# Patient Record
Sex: FEMALE | Race: WHITE | NOT HISPANIC OR LATINO | Employment: OTHER | URBAN - METROPOLITAN AREA
[De-identification: names, ages, dates, MRNs, and addresses within clinical notes are randomized per-mention and may not be internally consistent; named-entity substitution may affect disease eponyms.]

---

## 2022-01-13 ENCOUNTER — MEDICAL CORRESPONDENCE (OUTPATIENT)
Dept: HEALTH INFORMATION MANAGEMENT | Facility: CLINIC | Age: 82
End: 2022-01-13
Payer: COMMERCIAL

## 2022-03-12 DIAGNOSIS — Z11.59 ENCOUNTER FOR SCREENING FOR OTHER VIRAL DISEASES: Primary | ICD-10-CM

## 2022-04-20 RX ORDER — HYDROCHLOROTHIAZIDE 12.5 MG/1
12.5 CAPSULE ORAL DAILY
COMMUNITY
Start: 2021-04-01

## 2022-04-20 RX ORDER — DORZOLAMIDE HCL 20 MG/ML
1 SOLUTION/ DROPS OPHTHALMIC 2 TIMES DAILY
COMMUNITY

## 2022-04-20 RX ORDER — AMLODIPINE BESYLATE 2.5 MG/1
7.5 TABLET ORAL DAILY
COMMUNITY
Start: 2021-04-01

## 2022-04-20 RX ORDER — MULTIVITAMIN
1 TABLET ORAL DAILY
COMMUNITY

## 2022-04-20 RX ORDER — TIOTROPIUM BROMIDE INHALATION SPRAY 3.12 UG/1
2 SPRAY, METERED RESPIRATORY (INHALATION) DAILY
COMMUNITY
Start: 2021-05-27

## 2022-04-25 ENCOUNTER — HOSPITAL ENCOUNTER (INPATIENT)
Facility: CLINIC | Age: 82
LOS: 2 days | Discharge: HOME | DRG: 177 | End: 2022-04-27
Attending: INTERNAL MEDICINE | Admitting: INTERNAL MEDICINE
Payer: COMMERCIAL

## 2022-04-25 ENCOUNTER — APPOINTMENT (OUTPATIENT)
Dept: GENERAL RADIOLOGY | Facility: CLINIC | Age: 82
DRG: 177 | End: 2022-04-25
Attending: ANESTHESIOLOGY
Payer: COMMERCIAL

## 2022-04-25 ENCOUNTER — ANESTHESIA (OUTPATIENT)
Dept: SURGERY | Facility: CLINIC | Age: 82
DRG: 177 | End: 2022-04-25
Payer: COMMERCIAL

## 2022-04-25 ENCOUNTER — ANESTHESIA EVENT (OUTPATIENT)
Dept: SURGERY | Facility: CLINIC | Age: 82
DRG: 177 | End: 2022-04-25
Payer: COMMERCIAL

## 2022-04-25 ENCOUNTER — APPOINTMENT (OUTPATIENT)
Dept: CT IMAGING | Facility: CLINIC | Age: 82
DRG: 177 | End: 2022-04-25
Attending: INTERNAL MEDICINE
Payer: COMMERCIAL

## 2022-04-25 DIAGNOSIS — J96.01 ACUTE RESPIRATORY FAILURE WITH HYPOXIA (H): Primary | ICD-10-CM

## 2022-04-25 DIAGNOSIS — J41.1 MUCOPURULENT CHRONIC BRONCHITIS (H): ICD-10-CM

## 2022-04-25 LAB
COLONOSCOPY: NORMAL
CREAT SERPL-MCNC: 0.86 MG/DL (ref 0.52–1.04)
CREAT SERPL-MCNC: 1 MG/DL (ref 0.52–1.04)
GFR SERPL CREATININE-BSD FRML MDRD: 56 ML/MIN/1.73M2
GFR SERPL CREATININE-BSD FRML MDRD: 67 ML/MIN/1.73M2

## 2022-04-25 PROCEDURE — 710N000010 HC RECOVERY PHASE 1, LEVEL 2, PER MIN: Performed by: INTERNAL MEDICINE

## 2022-04-25 PROCEDURE — 258N000003 HC RX IP 258 OP 636: Performed by: NURSE ANESTHETIST, CERTIFIED REGISTERED

## 2022-04-25 PROCEDURE — 250N000009 HC RX 250: Performed by: ANESTHESIOLOGY

## 2022-04-25 PROCEDURE — 999N000141 HC STATISTIC PRE-PROCEDURE NURSING ASSESSMENT: Performed by: INTERNAL MEDICINE

## 2022-04-25 PROCEDURE — 94640 AIRWAY INHALATION TREATMENT: CPT | Mod: 76

## 2022-04-25 PROCEDURE — 82565 ASSAY OF CREATININE: CPT | Performed by: ANESTHESIOLOGY

## 2022-04-25 PROCEDURE — 250N000009 HC RX 250: Performed by: NURSE ANESTHETIST, CERTIFIED REGISTERED

## 2022-04-25 PROCEDURE — 36415 COLL VENOUS BLD VENIPUNCTURE: CPT | Performed by: INTERNAL MEDICINE

## 2022-04-25 PROCEDURE — 71046 X-RAY EXAM CHEST 2 VIEWS: CPT

## 2022-04-25 PROCEDURE — 88305 TISSUE EXAM BY PATHOLOGIST: CPT | Mod: 26 | Performed by: PATHOLOGY

## 2022-04-25 PROCEDURE — 250N000009 HC RX 250: Performed by: INTERNAL MEDICINE

## 2022-04-25 PROCEDURE — 360N000075 HC SURGERY LEVEL 2, PER MIN: Performed by: INTERNAL MEDICINE

## 2022-04-25 PROCEDURE — 370N000017 HC ANESTHESIA TECHNICAL FEE, PER MIN: Performed by: INTERNAL MEDICINE

## 2022-04-25 PROCEDURE — 250N000011 HC RX IP 250 OP 636: Performed by: NURSE ANESTHETIST, CERTIFIED REGISTERED

## 2022-04-25 PROCEDURE — 99223 1ST HOSP IP/OBS HIGH 75: CPT | Performed by: INTERNAL MEDICINE

## 2022-04-25 PROCEDURE — 36415 COLL VENOUS BLD VENIPUNCTURE: CPT | Performed by: ANESTHESIOLOGY

## 2022-04-25 PROCEDURE — 999N000157 HC STATISTIC RCP TIME EA 10 MIN

## 2022-04-25 PROCEDURE — 0DBK8ZZ EXCISION OF ASCENDING COLON, VIA NATURAL OR ARTIFICIAL OPENING ENDOSCOPIC: ICD-10-PCS | Performed by: INTERNAL MEDICINE

## 2022-04-25 PROCEDURE — 258N000003 HC RX IP 258 OP 636: Performed by: INTERNAL MEDICINE

## 2022-04-25 PROCEDURE — 88305 TISSUE EXAM BY PATHOLOGIST: CPT | Mod: TC | Performed by: INTERNAL MEDICINE

## 2022-04-25 PROCEDURE — 272N000001 HC OR GENERAL SUPPLY STERILE: Performed by: INTERNAL MEDICINE

## 2022-04-25 PROCEDURE — 250N000011 HC RX IP 250 OP 636: Performed by: INTERNAL MEDICINE

## 2022-04-25 PROCEDURE — 120N000001 HC R&B MED SURG/OB

## 2022-04-25 PROCEDURE — 71250 CT THORAX DX C-: CPT

## 2022-04-25 PROCEDURE — 82565 ASSAY OF CREATININE: CPT | Performed by: INTERNAL MEDICINE

## 2022-04-25 PROCEDURE — 250N000013 HC RX MED GY IP 250 OP 250 PS 637: Performed by: INTERNAL MEDICINE

## 2022-04-25 RX ORDER — MEPERIDINE HYDROCHLORIDE 25 MG/ML
25 INJECTION INTRAMUSCULAR; INTRAVENOUS; SUBCUTANEOUS
Status: DISCONTINUED | OUTPATIENT
Start: 2022-04-25 | End: 2022-04-25 | Stop reason: HOSPADM

## 2022-04-25 RX ORDER — NALOXONE HYDROCHLORIDE 0.4 MG/ML
0.4 INJECTION, SOLUTION INTRAMUSCULAR; INTRAVENOUS; SUBCUTANEOUS
Status: DISCONTINUED | OUTPATIENT
Start: 2022-04-25 | End: 2022-04-27 | Stop reason: HOSPADM

## 2022-04-25 RX ORDER — LIDOCAINE 40 MG/G
CREAM TOPICAL
Status: DISCONTINUED | OUTPATIENT
Start: 2022-04-25 | End: 2022-04-25 | Stop reason: HOSPADM

## 2022-04-25 RX ORDER — SODIUM CHLORIDE, SODIUM LACTATE, POTASSIUM CHLORIDE, CALCIUM CHLORIDE 600; 310; 30; 20 MG/100ML; MG/100ML; MG/100ML; MG/100ML
INJECTION, SOLUTION INTRAVENOUS CONTINUOUS
Status: DISCONTINUED | OUTPATIENT
Start: 2022-04-25 | End: 2022-04-25 | Stop reason: HOSPADM

## 2022-04-25 RX ORDER — PROCHLORPERAZINE MALEATE 5 MG
5 TABLET ORAL EVERY 6 HOURS PRN
Status: DISCONTINUED | OUTPATIENT
Start: 2022-04-25 | End: 2022-04-27 | Stop reason: HOSPADM

## 2022-04-25 RX ORDER — NALOXONE HYDROCHLORIDE 0.4 MG/ML
0.2 INJECTION, SOLUTION INTRAMUSCULAR; INTRAVENOUS; SUBCUTANEOUS
Status: DISCONTINUED | OUTPATIENT
Start: 2022-04-25 | End: 2022-04-27 | Stop reason: HOSPADM

## 2022-04-25 RX ORDER — ALBUTEROL SULFATE 0.83 MG/ML
2.5 SOLUTION RESPIRATORY (INHALATION) ONCE
Status: COMPLETED | OUTPATIENT
Start: 2022-04-25 | End: 2022-04-25

## 2022-04-25 RX ORDER — IPRATROPIUM BROMIDE AND ALBUTEROL SULFATE 2.5; .5 MG/3ML; MG/3ML
3 SOLUTION RESPIRATORY (INHALATION)
Status: DISCONTINUED | OUTPATIENT
Start: 2022-04-25 | End: 2022-04-27 | Stop reason: HOSPADM

## 2022-04-25 RX ORDER — OXYCODONE HYDROCHLORIDE 5 MG/1
5 TABLET ORAL EVERY 4 HOURS PRN
Status: DISCONTINUED | OUTPATIENT
Start: 2022-04-25 | End: 2022-04-25 | Stop reason: HOSPADM

## 2022-04-25 RX ORDER — GLYCOPYRROLATE 0.2 MG/ML
INJECTION, SOLUTION INTRAMUSCULAR; INTRAVENOUS PRN
Status: DISCONTINUED | OUTPATIENT
Start: 2022-04-25 | End: 2022-04-25

## 2022-04-25 RX ORDER — ONDANSETRON 2 MG/ML
4 INJECTION INTRAMUSCULAR; INTRAVENOUS
Status: DISCONTINUED | OUTPATIENT
Start: 2022-04-25 | End: 2022-04-25 | Stop reason: HOSPADM

## 2022-04-25 RX ORDER — HYDROCHLOROTHIAZIDE 12.5 MG/1
12.5 CAPSULE ORAL DAILY
Status: DISCONTINUED | OUTPATIENT
Start: 2022-04-25 | End: 2022-04-27 | Stop reason: HOSPADM

## 2022-04-25 RX ORDER — FLUMAZENIL 0.1 MG/ML
0.2 INJECTION, SOLUTION INTRAVENOUS
Status: ACTIVE | OUTPATIENT
Start: 2022-04-25 | End: 2022-04-25

## 2022-04-25 RX ORDER — ONDANSETRON 2 MG/ML
4 INJECTION INTRAMUSCULAR; INTRAVENOUS EVERY 6 HOURS PRN
Status: DISCONTINUED | OUTPATIENT
Start: 2022-04-25 | End: 2022-04-27 | Stop reason: HOSPADM

## 2022-04-25 RX ORDER — PROPOFOL 10 MG/ML
INJECTION, EMULSION INTRAVENOUS CONTINUOUS PRN
Status: DISCONTINUED | OUTPATIENT
Start: 2022-04-25 | End: 2022-04-25

## 2022-04-25 RX ORDER — AMPICILLIN AND SULBACTAM 2; 1 G/1; G/1
3 INJECTION, POWDER, FOR SOLUTION INTRAMUSCULAR; INTRAVENOUS EVERY 6 HOURS
Status: DISCONTINUED | OUTPATIENT
Start: 2022-04-25 | End: 2022-04-27 | Stop reason: HOSPADM

## 2022-04-25 RX ORDER — DEXTROSE MONOHYDRATE, SODIUM CHLORIDE, AND POTASSIUM CHLORIDE 50; 1.49; 4.5 G/1000ML; G/1000ML; G/1000ML
INJECTION, SOLUTION INTRAVENOUS CONTINUOUS
Status: DISCONTINUED | OUTPATIENT
Start: 2022-04-25 | End: 2022-04-27 | Stop reason: HOSPADM

## 2022-04-25 RX ORDER — LIDOCAINE 40 MG/G
CREAM TOPICAL
Status: DISCONTINUED | OUTPATIENT
Start: 2022-04-25 | End: 2022-04-27 | Stop reason: HOSPADM

## 2022-04-25 RX ORDER — DORZOLAMIDE HCL 20 MG/ML
1 SOLUTION/ DROPS OPHTHALMIC 2 TIMES DAILY
Status: DISCONTINUED | OUTPATIENT
Start: 2022-04-25 | End: 2022-04-27 | Stop reason: HOSPADM

## 2022-04-25 RX ORDER — LABETALOL HYDROCHLORIDE 5 MG/ML
10 INJECTION, SOLUTION INTRAVENOUS EVERY 10 MIN PRN
Status: DISCONTINUED | OUTPATIENT
Start: 2022-04-25 | End: 2022-04-25 | Stop reason: HOSPADM

## 2022-04-25 RX ORDER — ONDANSETRON 2 MG/ML
INJECTION INTRAMUSCULAR; INTRAVENOUS PRN
Status: DISCONTINUED | OUTPATIENT
Start: 2022-04-25 | End: 2022-04-25

## 2022-04-25 RX ORDER — ONDANSETRON 4 MG/1
4 TABLET, ORALLY DISINTEGRATING ORAL EVERY 6 HOURS PRN
Status: DISCONTINUED | OUTPATIENT
Start: 2022-04-25 | End: 2022-04-27 | Stop reason: HOSPADM

## 2022-04-25 RX ORDER — ENOXAPARIN SODIUM 100 MG/ML
40 INJECTION SUBCUTANEOUS EVERY 24 HOURS
Status: DISCONTINUED | OUTPATIENT
Start: 2022-04-25 | End: 2022-04-27 | Stop reason: HOSPADM

## 2022-04-25 RX ORDER — SODIUM CHLORIDE, SODIUM LACTATE, POTASSIUM CHLORIDE, CALCIUM CHLORIDE 600; 310; 30; 20 MG/100ML; MG/100ML; MG/100ML; MG/100ML
INJECTION, SOLUTION INTRAVENOUS CONTINUOUS PRN
Status: DISCONTINUED | OUTPATIENT
Start: 2022-04-25 | End: 2022-04-25

## 2022-04-25 RX ORDER — PROPOFOL 10 MG/ML
INJECTION, EMULSION INTRAVENOUS PRN
Status: DISCONTINUED | OUTPATIENT
Start: 2022-04-25 | End: 2022-04-25

## 2022-04-25 RX ORDER — METHYLPREDNISOLONE SODIUM SUCCINATE 40 MG/ML
40 INJECTION, POWDER, LYOPHILIZED, FOR SOLUTION INTRAMUSCULAR; INTRAVENOUS EVERY 8 HOURS
Status: DISCONTINUED | OUTPATIENT
Start: 2022-04-25 | End: 2022-04-27 | Stop reason: HOSPADM

## 2022-04-25 RX ORDER — KETOROLAC TROMETHAMINE 15 MG/ML
15 INJECTION, SOLUTION INTRAMUSCULAR; INTRAVENOUS
Status: DISCONTINUED | OUTPATIENT
Start: 2022-04-25 | End: 2022-04-25 | Stop reason: HOSPADM

## 2022-04-25 RX ORDER — FENTANYL CITRATE 50 UG/ML
25 INJECTION, SOLUTION INTRAMUSCULAR; INTRAVENOUS
Status: DISCONTINUED | OUTPATIENT
Start: 2022-04-25 | End: 2022-04-25 | Stop reason: HOSPADM

## 2022-04-25 RX ORDER — ONDANSETRON 4 MG/1
4 TABLET, ORALLY DISINTEGRATING ORAL EVERY 30 MIN PRN
Status: DISCONTINUED | OUTPATIENT
Start: 2022-04-25 | End: 2022-04-25

## 2022-04-25 RX ORDER — FENTANYL CITRATE 50 UG/ML
25 INJECTION, SOLUTION INTRAMUSCULAR; INTRAVENOUS EVERY 5 MIN PRN
Status: DISCONTINUED | OUTPATIENT
Start: 2022-04-25 | End: 2022-04-25 | Stop reason: HOSPADM

## 2022-04-25 RX ORDER — HYDROMORPHONE HCL IN WATER/PF 6 MG/30 ML
0.4 PATIENT CONTROLLED ANALGESIA SYRINGE INTRAVENOUS EVERY 5 MIN PRN
Status: DISCONTINUED | OUTPATIENT
Start: 2022-04-25 | End: 2022-04-25 | Stop reason: HOSPADM

## 2022-04-25 RX ORDER — ONDANSETRON 2 MG/ML
4 INJECTION INTRAMUSCULAR; INTRAVENOUS EVERY 30 MIN PRN
Status: DISCONTINUED | OUTPATIENT
Start: 2022-04-25 | End: 2022-04-25

## 2022-04-25 RX ADMIN — DORZOLAMIDE HYDROCHLORIDE 1 DROP: 20 SOLUTION/ DROPS OPHTHALMIC at 21:57

## 2022-04-25 RX ADMIN — SODIUM CHLORIDE, POTASSIUM CHLORIDE, SODIUM LACTATE AND CALCIUM CHLORIDE: 600; 310; 30; 20 INJECTION, SOLUTION INTRAVENOUS at 09:28

## 2022-04-25 RX ADMIN — PROPOFOL 50 MG: 10 INJECTION, EMULSION INTRAVENOUS at 09:35

## 2022-04-25 RX ADMIN — ENOXAPARIN SODIUM 40 MG: 40 INJECTION SUBCUTANEOUS at 21:57

## 2022-04-25 RX ADMIN — ALBUTEROL SULFATE 2.5 MG: 2.5 SOLUTION RESPIRATORY (INHALATION) at 10:15

## 2022-04-25 RX ADMIN — AMPICILLIN SODIUM AND SULBACTAM SODIUM 3 G: 2; 1 INJECTION, POWDER, FOR SOLUTION INTRAMUSCULAR; INTRAVENOUS at 16:34

## 2022-04-25 RX ADMIN — ONDANSETRON HYDROCHLORIDE 4 MG: 2 INJECTION, SOLUTION INTRAVENOUS at 09:58

## 2022-04-25 RX ADMIN — IPRATROPIUM BROMIDE AND ALBUTEROL SULFATE 3 ML: 2.5; .5 SOLUTION RESPIRATORY (INHALATION) at 19:52

## 2022-04-25 RX ADMIN — PROPOFOL 30 MG: 10 INJECTION, EMULSION INTRAVENOUS at 09:39

## 2022-04-25 RX ADMIN — LIDOCAINE HYDROCHLORIDE 50 MG: 10 INJECTION, SOLUTION EPIDURAL; INFILTRATION; INTRACAUDAL; PERINEURAL at 09:35

## 2022-04-25 RX ADMIN — PROPOFOL 100 MCG/KG/MIN: 10 INJECTION, EMULSION INTRAVENOUS at 09:35

## 2022-04-25 RX ADMIN — GLYCOPYRROLATE 0.1 MG: 0.2 INJECTION, SOLUTION INTRAMUSCULAR; INTRAVENOUS at 09:44

## 2022-04-25 RX ADMIN — AMPICILLIN SODIUM AND SULBACTAM SODIUM 3 G: 2; 1 INJECTION, POWDER, FOR SOLUTION INTRAMUSCULAR; INTRAVENOUS at 21:53

## 2022-04-25 RX ADMIN — POTASSIUM CHLORIDE, DEXTROSE MONOHYDRATE AND SODIUM CHLORIDE: 150; 5; 450 INJECTION, SOLUTION INTRAVENOUS at 16:23

## 2022-04-25 RX ADMIN — ALBUTEROL SULFATE 2.5 MG: 2.5 SOLUTION RESPIRATORY (INHALATION) at 11:56

## 2022-04-25 RX ADMIN — METHYLPREDNISOLONE SODIUM SUCCINATE 40 MG: 40 INJECTION, POWDER, FOR SOLUTION INTRAMUSCULAR; INTRAVENOUS at 17:04

## 2022-04-25 RX ADMIN — AMLODIPINE BESYLATE 7.5 MG: 5 TABLET ORAL at 17:08

## 2022-04-25 RX ADMIN — HYDROCHLOROTHIAZIDE 12.5 MG: 12.5 CAPSULE ORAL at 17:08

## 2022-04-25 ASSESSMENT — ACTIVITIES OF DAILY LIVING (ADL)
ADLS_ACUITY_SCORE: 12

## 2022-04-25 ASSESSMENT — COPD QUESTIONNAIRES: COPD: 1

## 2022-04-25 NOTE — PHARMACY-ADMISSION MEDICATION HISTORY
Admission medication history interview status for this patient is complete. See [facility] admission navigator for allergy information, prior to admission medications and immunization status.     PTA med list completed by pre-admitting nurse,   Nurse Kayil Hutchinson RN Wed Apr 20, 2022  2:37 PM       Prior to Admission medications    Medication Sig Last Dose Taking? Auth Provider   amLODIPine (NORVASC) 2.5 MG tablet Take 7.5 mg by mouth daily 4/25/2022 at 0515 Yes Reported, Patient   calcium carbonate (OS-MERCY) 1500 (600 Ca) MG tablet Take 600 mg by mouth daily Past Week at Unknown time Yes Reported, Patient   cholecalciferol (VITAMIN D3) 125 mcg (5000 units) capsule Take 125 mcg by mouth daily 4/25/2022 at 0515 Yes Reported, Patient   dorzolamide (TRUSOPT) 2 % ophthalmic solution Place 1 drop into both eyes 2 times daily 4/25/2022 at 0515 Yes Reported, Patient   hydrochlorothiazide (MICROZIDE) 12.5 MG capsule Take 12.5 mg by mouth daily Past Week at Unknown time Yes Reported, Patient   Multiple Vitamin (ONE-A-DAY ESSENTIAL) TABS Take 1 tablet by mouth daily Past Week at Unknown time Yes Reported, Patient   tiotropium (SPIRIVA RESPIMAT) 2.5 MCG/ACT inhaler Inhale 2 puffs into the lungs daily 4/25/2022 at 0515 Yes Reported, Patient

## 2022-04-25 NOTE — ANESTHESIA POSTPROCEDURE EVALUATION
"Patient: Augusta Schmidt    Procedure: Procedure(s):  COLONOSCOPY       Anesthesia Type:  MAC    Note:     Postop Pain Control: Uneventful            Sign Out: Well controlled pain   PONV: No   Neuro/Psych: Uneventful            Sign Out: Acceptable/Baseline neuro status   Airway/Respiratory:    CV/Hemodynamics: Uneventful            Sign Out: Acceptable CV status   Other NRE: NONE   DID A NON-ROUTINE EVENT OCCUR?     Event details/Postop Comments:  Patient had emesis of gastric contents at end of case and likely aspirated some gastric fluid.  She has been doing fairly well in PACU with clear breath sounds, for the most part, but she complains of being  \"not able to breath\", so will keep her on observation overnight.           Last vitals:  Vitals Value Taken Time   /74 04/25/22 1320   Temp 97.4  F (36.3  C) 04/25/22 1300   Pulse 97 04/25/22 1329   Resp 35 04/25/22 1329   SpO2 90 % 04/25/22 1329   Vitals shown include unvalidated device data.    Electronically Signed By: Roman Short MD  April 25, 2022  1:31 PM  "

## 2022-04-25 NOTE — CONSULTS
[facility]    Hospitalist consult note     Name: Augusta Schmidt    MRN: [id number]  YOB: 1940    Age: 81 year old  Date of admission: 4/25/2022  Primary care provider: Eva Baker  Admitting physician : Boom Miles M.D. ,M.B.A.       Brief summary of admission assessment/MDM:    Augusta Schmidt is a 81 year old  female with a significant past medical history of COPD, hypertension, and osteoporosis admitted after elective colonoscopy for respiratory failure.  Patient has family history of colon cancer and she is getting screening colonoscopy every 3 years and Dr. Neumann of [place] [facility] completed colonoscopy at [facility] today.  After completion of her colonoscopy and transfer  to PACU, patient developed respiratory distress following episode of emesis and apparent aspiration.    Patient was emergently treated with nebulizer treatment, supplemental oxygen and hospitalist service was consulted for further management.  She initially required 8 L of oxygen through oxymask.    Chest x-ray showed extensive infiltrate throughout the left  Lung and quite impressive as shown below .                Assessment and Plan       #1.  Acute respiratory failure with hypoxia likely secondary to aspiration pneumonitis in the setting of underlying COPD.  Acute COPD exacerbation with diffuse wheezing  -- Continue supplemental oxygen, nebulizer treatment, IV steroid, close monitoring and empiric Unasyn to prevent superimposed bacterial pneumonia.  I will get CT of the chest without contrast for further assessment of the infiltrates.      #2.  Status post colonoscopy by [facility]-Dr. eNumann with whom I briefly spoke.  -- Elective screening colonoscopy.  6 mm polyp in the ascending colon removed with a cold snare otherwise exam was normal.        Chronic  comorbid medical conditions:    #3.  Hypertension on hydrochlorothiazide  #4.  Degenerative  disc disease  #5.  Osteoporosis  #6.  Protein calorie malnutrition  #7.  COPD, well controlled with inhaler.  Stopped smoking several years ago.  Does not use home oxygen  #8.  Negative COVID test.                # Admission Status: We will admit to inpatient due to concern for possible clinical deterioration  # Diet:Diet advanced    # Activity:Regular activity    # Education/Counseling :Anticipatory guidance offered    # Consults: Primary team to follow    # VTE prophylactic measures:prophylaxis against venous thromboembolism with Lovenox      # Code Status:Full Code    # Disposition:anticipate discharge to home and Anticipate discharge in 3 days        Disclaimer: This note consists of symbols derived from keyboarding, dictation and/or voice recognition software. As a result, there may be errors in the script that have gone undetected. Please consider this when interpreting information found in this chart.             Chief Complaint:     Respiratory distress, hypoxia  History is obtained from the patient and electronic health record          History of Present Illness:      This patient is a 81 year old  female with a significant past medical history of COPD, hypertension and osteoporosis who presents with the following condition requiring a hospital admission:    Aspiration event and pneumonitis with acute hypoxic respiratory failure s/p colonoscopy    Patient is 81-year-old female who had a colonoscopy earlier today who developed respiratory distress following the procedure.  Patient developed respiratory failure with hypoxia requiring supplemental oxygen and hospitalist service was consulted for further evaluation and management.  Patient came for screening colonoscopy due to family history of colon cancer.  She had uncomplicated procedure but following the procedure patient developed an episode of emesis followed by difficulty breathing and respiratory failure.  This procedure was with MAC.  Patient developed  respiratory distress upon arrival to PACU.  Emergency nebulizer was given as patient was diffusely wheezing and in respiratory distress.  Anesthesiologist obtained chest x-ray which showed extensive infiltrates on the left side.  She required supplemental oxygen and after the breathing treatment, patient condition improved and hospitalist service was consulted for admission to the hospital and further care.  Preoperative history and physical was reviewed by me.  I spoke with Dr. Neumann of [facility] who stated that the procedure was unremarkable.  Patient had shortness of breath but no chest pain.  Denies any fever or chills.  She is not currently smoking cigarettes.  She does not use home oxygen.           Past Medical History:     Past Medical History:   Diagnosis Date     COPD (chronic obstructive pulmonary disease) (H)      Hypertension             Past Surgical History:     Past Surgical History:   Procedure Laterality Date     BACK SURGERY       CHOLECYSTECTOMY               Social History:     Social History     Tobacco Use     Smoking status: Former Smoker     Smokeless tobacco: Never Used   Substance Use Topics     Alcohol use: Yes     Comment: rarely             Family History:   Reviewed and non contributory        Allergies:     Allergies   Allergen Reactions     Latanoprost      Shellfish-Derived Products Other (See Comments)     Alendronic Acid Other (See Comments) and Rash     Ipratropium Rash     Levofloxacin Other (See Comments) and Rash     Comment: Rash, Description: Levaquin, Comment: Rash, Description: Levaquin     Penicillins Rash     Shellfish Allergy Nausea and Vomiting             Medications:        Prior to Admission medications    Medication Sig Last Dose Taking? Auth Provider   amLODIPine (NORVASC) 2.5 MG tablet Take 7.5 mg by mouth daily 4/25/2022 at 0515 Yes Reported, Patient   calcium carbonate (OS-MERCY) 1500 (600 Ca) MG tablet Take 600 mg by mouth daily Past Week at Unknown time  Yes Reported, Patient   cholecalciferol (VITAMIN D3) 125 mcg (5000 units) capsule Take 125 mcg by mouth daily 4/25/2022 at 0515 Yes Reported, Patient   dorzolamide (TRUSOPT) 2 % ophthalmic solution Place 1 drop into both eyes 2 times daily 4/25/2022 at 0515 Yes Reported, Patient   hydrochlorothiazide (MICROZIDE) 12.5 MG capsule Take 12.5 mg by mouth daily Past Week at Unknown time Yes Reported, Patient   Multiple Vitamin (ONE-A-DAY ESSENTIAL) TABS Take 1 tablet by mouth daily Past Week at Unknown time Yes Reported, Patient   tiotropium (SPIRIVA RESPIMAT) 2.5 MCG/ACT inhaler Inhale 2 puffs into the lungs daily 4/25/2022 at 0515 Yes Reported, Patient          Review of Systems:     A Comprehensive greater than 10 system review of systems was carried out.  Pertinent positives and negatives are noted above in HPI.  Otherwise negative for contributory information.           Physical Exam:     Vital signs were reviewed    Temp:  [96.5  F (35.8  C)-98.2  F (36.8  C)] 96.5  F (35.8  C)  Pulse:  [] 97  Resp:  [12-37] 18  BP: ()/() 112/62  SpO2:  [84 %-97 %] 93 %        GEN: awake, alert, cooperative, no apparent distress, oriented x 3    NECK:Supple ,no mass or thyromegaly     HEENT:  Normocephalic/atraumatic, no scleral icterus, no nasal discharge, mouth moist.    CV:  Regular rate and rhythm, no murmur or JVD.  S1 + S2 noted, no S3 or S4.    LUNGS: No increased work of breathing but she has diffusely wheezing more on the left posteriorly and anteriorly.  She has coarse crackles on the left posterior lungs.    erally without rales/rhonchi/wheezing/retractions.  Symmetric chest rise on inhalation noted.    ABD:  Active bowel sounds, soft, non-tender/non-distended.  No rebound/guarding/rigidity.    EXT:  No edema.  No cyanosis.  No joint synovitis noted.Lower extremity pulses are normal bilaterally and     LGS: No cervical or axillary lymphadenopathy     SKIN:  Dry to touch, warm ,no exanthems noted in the  visualized areas.    Neurologic:Grossly intact,non focal . No acute focal neurologic deficit     Psychaitric exam: Mood and affect normal                Data:       All laboratory and imaging data in the past 24 hours reviewed     Results for orders placed or performed during the hospital encounter of 04/25/22   COLONOSCOPY     Status: None   Result Value Ref Range    COLONOSCOPY       [facility]  _______________________________________________________________________________  Patient Name: Augusta Rincon        Procedure Date: 4/25/2022 9:25 AM  MRN: [id number]                       Account Number: [id number]  YOB: 1940              Admit Type: Outpatient  Age: 81                               Gender: Female  Attending MD: Norberto Neumann MD Total Sedation Time: Minutes of continuous   bedside 1:1: MAC anesthesia  Instrument Name: 257 - Pediatric Colonoscope   _______________________________________________________________________________     Procedure:                Colonoscopy  Indications:              High risk colon cancer surveillance: Personal                             history of colonic polyps  Providers:                Norberto Neumann MD (Doctor)  Referring MD:               Medicines:                Monitored Anesthesia Care  Complications:            No immediate complications.  __________________________ _____________________________________________________  Procedure:                Pre-Anesthesia Assessment:                            - Prior to the procedure, a History and Physical                             was performed, and patient medications and                             allergies were reviewed. The patient is competent.                             The risks and benefits of the procedure and the                             sedation options and risks were discussed with the                             patient. All questions were answered and  informed                             consent was obtained. Patient identification and                             proposed procedure were verified by the physician                             in the pre-procedure area. Mental Status                             Examination: normal. Prophylactic Antibiotics: The                             patient does not require prophylactic antibiotics.                             Prior Anticoagulants: T he patient has taken no                             anticoagulant or antiplatelet agents. ASA Grade                             Assessment: II - A patient with mild systemic                             disease. After reviewing the risks and benefits,                             the patient was deemed in satisfactory condition to                             undergo the procedure. The anesthesia plan was to                             use monitored anesthesia care (MAC). Immediately                             prior to administration of medications, the patient                             was re-assessed for adequacy to receive sedatives.                             The heart rate, respiratory rate, oxygen                             saturations, blood pressure, adequacy of pulmonary                             ventilation, and response to care were monitored                             throughout the procedure. The physical status of                             the patient was re-assess ed after the procedure.                            After obtaining informed consent, the colonoscope                             was passed under direct vision. Throughout the                             procedure, the patient's blood pressure, pulse, and                             oxygen saturations were monitored continuously. The                             Olympus Pediatric Colonoscope Model # [id number],                             [place] # 257, SN # [id number] was introduced through                              the anus and advanced to the cecum, identified by                             appendiceal orifice and ileocecal valve. The                             colonoscopy was performed without difficulty. The                             patient tolerated the procedure well. The quality                             of the bowel preparation was good. The ileocecal                             valve, appendiceal orifice, and rectum were                             photographed.                                                                                    Findings:       A 6 mm polyp was found in the ascending colon. The polyp was sessile.        The polyp was removed with a cold snare. Resection and retrieval were        complete.       The exam was otherwise without abnormality.                                                                                   Impression:               - One 6 mm polyp in the ascending colon, removed                             with a cold snare. Resected and retrieved.                            - The examination was otherwise normal.  Recommendation:           - Await pathology results.                            - No need for future colonoscopy exams for colon                             cancer screening.                                                                                   Procedure Code(s):       --- Professional ---       07320, Colonoscopy, flexible; with removal of tumor(s), polyp(s), or        other lesion(s) b y snare technique  Diagnosis Code(s):       --- Professional ---       Z86.010, Personal history of colonic polyps       K63.5, Polyp of colon    CPT copyright 2020 [employer]. All rights reserved.    The codes documented in this report are preliminary and upon  review may   be revised to meet current compliance requirements.    Norberto Neumann M.D.  ______________________  Norberto Neumann MD  4/25/2022 9:55:18 AM  Number  of Addenda: 0    Note Initiated On: 4/25/2022 9:25 AM  MRN:                      [id number]  Procedure Date:           4/25/2022 9:25:34 AM  Scope Withdrawal Time: 0 hours 7 minutes 58 seconds   Total Procedure Duration: 0 hours 13 minutes 49 seconds   Estimated Blood Loss:       Scope In: 9:36:16 AM  Scope Out: 9:50:05 AM     XR Chest 2 Views     Status: None    Narrative    CHEST TWO VIEWS 4/25/2022 2:00 PM     HISTORY: Possible aspiration of gastric contents    COMPARISON: None.       Impression    IMPRESSION: Extensive infiltrates throughout the left lung. Minimal  interstitial changes on the right. Question minimal effusion on the  left. The cardiac silhouette is not enlarged. Pulmonary vasculature is  unremarkable.    BURAK HOBBS MD         SYSTEM ID:  [id number]   Creatinine     Status: Normal   Result Value Ref Range    Creatinine 0.86 0.52 - 1.04 mg/dL    GFR Estimate 67 >60 mL/min/1.73m2   Creatinine     Status: Abnormal   Result Value Ref Range    Creatinine 1.00 0.52 - 1.04 mg/dL    GFR Estimate 56 (L) >60 mL/min/1.73m2            Recent Results (from the past 48 hour(s))   XR Chest 2 Views    Narrative    CHEST TWO VIEWS 4/25/2022 2:00 PM     HISTORY: Possible aspiration of gastric contents    COMPARISON: None.       Impression    IMPRESSION: Extensive infiltrates throughout the left lung. Minimal  interstitial changes on the right. Question minimal effusion on the  left. The cardiac silhouette is not enlarged. Pulmonary vasculature is  unremarkable.    BURAK HOBBS MD         SYSTEM ID:  [id number]        All imaging studies reviewed by me.         Patient`s old medical records reviewed and case discussed with Dr. Neumann

## 2022-04-25 NOTE — PROVIDER NOTIFICATION
"Pt still feels like she's \"choking\".  Restless.  Oxygen satrs 88-91% on 3L nasal cannula.  Denies pain.  LS diminished throughout with inspirary wheezes noted in upper lungs, L>R.  IS performed with poor to fair tolerance, 500ml x 5.  Dr. Short notified; albuterol nebulizer ordered x 1 as repeat from prior one.    "

## 2022-04-25 NOTE — OR NURSING
Pt with respiratory distress upon arrival to pacu, emergent neb given once patient in pacu.  Pre neb, patient had inspiratory and expiratory wheezes with very pronounced retractions and tugging throughout.  After neb, wheezes improved along with retractions lessened.  On transfer of care, pt with increased wheezing noted, spoke with SLOANE Short, no new orders at this time, will continue to monitor in pacu

## 2022-04-25 NOTE — PLAN OF CARE
Pt arrived to floor @1500. VSS. Denied pain. C/o dyspnea. On 4 L oxygen nasal canula. On capno. Continue monitoring respirations and oxygen sats.

## 2022-04-26 LAB
ANION GAP SERPL CALCULATED.3IONS-SCNC: 7 MMOL/L (ref 3–14)
BUN SERPL-MCNC: 21 MG/DL (ref 7–30)
CALCIUM SERPL-MCNC: 8.7 MG/DL (ref 8.5–10.1)
CHLORIDE BLD-SCNC: 103 MMOL/L (ref 94–109)
CO2 SERPL-SCNC: 27 MMOL/L (ref 20–32)
CREAT SERPL-MCNC: 1.09 MG/DL (ref 0.52–1.04)
ERYTHROCYTE [DISTWIDTH] IN BLOOD BY AUTOMATED COUNT: 12.2 % (ref 10–15)
GFR SERPL CREATININE-BSD FRML MDRD: 51 ML/MIN/1.73M2
GLUCOSE BLD-MCNC: 161 MG/DL (ref 70–99)
HCT VFR BLD AUTO: 36.3 % (ref 35–47)
HGB BLD-MCNC: 11.7 G/DL (ref 11.7–15.7)
MCH RBC QN AUTO: 31.6 PG (ref 26.5–33)
MCHC RBC AUTO-ENTMCNC: 32.2 G/DL (ref 31.5–36.5)
MCV RBC AUTO: 98 FL (ref 78–100)
PATH REPORT.COMMENTS IMP SPEC: NORMAL
PATH REPORT.COMMENTS IMP SPEC: NORMAL
PATH REPORT.FINAL DX SPEC: NORMAL
PATH REPORT.GROSS SPEC: NORMAL
PATH REPORT.MICROSCOPIC SPEC OTHER STN: NORMAL
PATH REPORT.RELEVANT HX SPEC: NORMAL
PHOTO IMAGE: NORMAL
PLATELET # BLD AUTO: 195 10E3/UL (ref 150–450)
POTASSIUM BLD-SCNC: 4.1 MMOL/L (ref 3.4–5.3)
RBC # BLD AUTO: 3.7 10E6/UL (ref 3.8–5.2)
SODIUM SERPL-SCNC: 137 MMOL/L (ref 133–144)
WBC # BLD AUTO: 18.8 10E3/UL (ref 4–11)

## 2022-04-26 PROCEDURE — 120N000001 HC R&B MED SURG/OB

## 2022-04-26 PROCEDURE — 99232 SBSQ HOSP IP/OBS MODERATE 35: CPT | Performed by: INTERNAL MEDICINE

## 2022-04-26 PROCEDURE — 36415 COLL VENOUS BLD VENIPUNCTURE: CPT | Performed by: INTERNAL MEDICINE

## 2022-04-26 PROCEDURE — 94640 AIRWAY INHALATION TREATMENT: CPT | Mod: 76

## 2022-04-26 PROCEDURE — 999N000157 HC STATISTIC RCP TIME EA 10 MIN

## 2022-04-26 PROCEDURE — 258N000003 HC RX IP 258 OP 636: Performed by: INTERNAL MEDICINE

## 2022-04-26 PROCEDURE — 250N000009 HC RX 250: Performed by: INTERNAL MEDICINE

## 2022-04-26 PROCEDURE — 94640 AIRWAY INHALATION TREATMENT: CPT

## 2022-04-26 PROCEDURE — 250N000013 HC RX MED GY IP 250 OP 250 PS 637: Performed by: INTERNAL MEDICINE

## 2022-04-26 PROCEDURE — 85027 COMPLETE CBC AUTOMATED: CPT | Performed by: INTERNAL MEDICINE

## 2022-04-26 PROCEDURE — 80048 BASIC METABOLIC PNL TOTAL CA: CPT | Performed by: INTERNAL MEDICINE

## 2022-04-26 PROCEDURE — 250N000011 HC RX IP 250 OP 636: Performed by: INTERNAL MEDICINE

## 2022-04-26 RX ADMIN — IPRATROPIUM BROMIDE AND ALBUTEROL SULFATE 3 ML: 2.5; .5 SOLUTION RESPIRATORY (INHALATION) at 14:06

## 2022-04-26 RX ADMIN — DORZOLAMIDE HYDROCHLORIDE 1 DROP: 20 SOLUTION/ DROPS OPHTHALMIC at 21:01

## 2022-04-26 RX ADMIN — HYDROCHLOROTHIAZIDE 12.5 MG: 12.5 CAPSULE ORAL at 09:13

## 2022-04-26 RX ADMIN — METHYLPREDNISOLONE SODIUM SUCCINATE 40 MG: 40 INJECTION, POWDER, FOR SOLUTION INTRAMUSCULAR; INTRAVENOUS at 00:44

## 2022-04-26 RX ADMIN — METHYLPREDNISOLONE SODIUM SUCCINATE 40 MG: 40 INJECTION, POWDER, FOR SOLUTION INTRAMUSCULAR; INTRAVENOUS at 17:03

## 2022-04-26 RX ADMIN — AMPICILLIN SODIUM AND SULBACTAM SODIUM 3 G: 2; 1 INJECTION, POWDER, FOR SOLUTION INTRAMUSCULAR; INTRAVENOUS at 03:20

## 2022-04-26 RX ADMIN — IPRATROPIUM BROMIDE AND ALBUTEROL SULFATE 3 ML: 2.5; .5 SOLUTION RESPIRATORY (INHALATION) at 08:58

## 2022-04-26 RX ADMIN — AMPICILLIN SODIUM AND SULBACTAM SODIUM 3 G: 2; 1 INJECTION, POWDER, FOR SOLUTION INTRAMUSCULAR; INTRAVENOUS at 09:11

## 2022-04-26 RX ADMIN — POTASSIUM CHLORIDE, DEXTROSE MONOHYDRATE AND SODIUM CHLORIDE: 150; 5; 450 INJECTION, SOLUTION INTRAVENOUS at 12:43

## 2022-04-26 RX ADMIN — AMPICILLIN SODIUM AND SULBACTAM SODIUM 3 G: 2; 1 INJECTION, POWDER, FOR SOLUTION INTRAMUSCULAR; INTRAVENOUS at 17:00

## 2022-04-26 RX ADMIN — POTASSIUM CHLORIDE, DEXTROSE MONOHYDRATE AND SODIUM CHLORIDE: 150; 5; 450 INJECTION, SOLUTION INTRAVENOUS at 02:33

## 2022-04-26 RX ADMIN — METHYLPREDNISOLONE SODIUM SUCCINATE 40 MG: 40 INJECTION, POWDER, FOR SOLUTION INTRAMUSCULAR; INTRAVENOUS at 09:12

## 2022-04-26 RX ADMIN — UMECLIDINIUM 1 PUFF: 62.5 AEROSOL, POWDER ORAL at 09:03

## 2022-04-26 RX ADMIN — IPRATROPIUM BROMIDE AND ALBUTEROL SULFATE 3 ML: 2.5; .5 SOLUTION RESPIRATORY (INHALATION) at 19:28

## 2022-04-26 RX ADMIN — IPRATROPIUM BROMIDE AND ALBUTEROL SULFATE 3 ML: 2.5; .5 SOLUTION RESPIRATORY (INHALATION) at 02:39

## 2022-04-26 RX ADMIN — DORZOLAMIDE HYDROCHLORIDE 1 DROP: 20 SOLUTION/ DROPS OPHTHALMIC at 09:15

## 2022-04-26 RX ADMIN — AMLODIPINE BESYLATE 7.5 MG: 5 TABLET ORAL at 09:12

## 2022-04-26 ASSESSMENT — ACTIVITIES OF DAILY LIVING (ADL)
ADLS_ACUITY_SCORE: 14
FALL_HISTORY_WITHIN_LAST_SIX_MONTHS: NO
ADLS_ACUITY_SCORE: 14
ADLS_ACUITY_SCORE: 12
ADLS_ACUITY_SCORE: 16
ADLS_ACUITY_SCORE: 16
ADLS_ACUITY_SCORE: 7
ADLS_ACUITY_SCORE: 7
ADLS_ACUITY_SCORE: 16
ADLS_ACUITY_SCORE: 14
DRESSING/BATHING_DIFFICULTY: NO
ADLS_ACUITY_SCORE: 14
ADLS_ACUITY_SCORE: 7
ADLS_ACUITY_SCORE: 14
ADLS_ACUITY_SCORE: 7
ADLS_ACUITY_SCORE: 16
ADLS_ACUITY_SCORE: 14
ADLS_ACUITY_SCORE: 14
CHANGE_IN_FUNCTIONAL_STATUS_SINCE_ONSET_OF_CURRENT_ILLNESS/INJURY: NO
ADLS_ACUITY_SCORE: 7
ADLS_ACUITY_SCORE: 14
ADLS_ACUITY_SCORE: 7
WALKING_OR_CLIMBING_STAIRS_DIFFICULTY: NO
ADLS_ACUITY_SCORE: 14
DIFFICULTY_EATING/SWALLOWING: NO
ADLS_ACUITY_SCORE: 7
ADLS_ACUITY_SCORE: 7
CONCENTRATING,_REMEMBERING_OR_MAKING_DECISIONS_DIFFICULTY: NO
ADLS_ACUITY_SCORE: 14
WEAR_GLASSES_OR_BLIND: NO
ADLS_ACUITY_SCORE: 7
DOING_ERRANDS_INDEPENDENTLY_DIFFICULTY: NO
TOILETING_ISSUES: NO

## 2022-04-26 NOTE — PLAN OF CARE
"Goal Outcome Evaluation:   Assumed cares from [id number]. Pt is A&Ox4, VSS on 3 L NC with pulse continuous. Denies pain & SOB. Tolerating regular diet. Up with A1 walker. IVF infusing at 100 ml/hr. IV abx UNASYN q6h. Pt voided once 450 ml and was bladder scanned for 115 at 0400. Bed alarm is on for safety and call light within reach.      BP (!) 83/42 (BP Location: Right arm)   Pulse 86   Temp 97.8  F (36.6  C) (Temporal)   Resp 20   Ht 1.525 m (5' 0.04\")   Wt 44.4 kg (97 lb 14.4 oz)   SpO2 96%   BMI 19.09 kg/m                          "

## 2022-04-26 NOTE — PROGRESS NOTES
Pt is alert and oriented, VSS, on 4L sats 93%, denied pain, on Capno, had CT don on the chest, waiting for the results . Pt  called to know how she was doing and would like to be called back when the chest results are out. A x1 with walker and gait belt, on regular diet, IV infusing at 100 ml/h, on Abx. Will continue to monitor.

## 2022-04-26 NOTE — PLAN OF CARE
Goal Outcome Evaluation:    Plan of Care Reviewed With: patient     Pt A&Ox4. VSS afebrile RA-2L NC, at rest she can sustain sats 88-92% but at times does need O2 placed. RT talked with patient about possible home O2 pros and cons. RT gave pt a flutter valve to help with getting fluid up from lungs. Pt up SBA and Gait belt. Tolerated walk in acosta but truly dropped to 77% RA sats. LS diminished. Tolerated regular diet. Continues on IV Unasyn. Voiding adequate. Pt agreed to stay one more night and see how she feels and if can wean of O2.

## 2022-04-26 NOTE — PROGRESS NOTES
[facility]    Medicine Progress Note - Hospitalist Service    Date of Admission:  4/25/2022    Assessment & Plan            Augusta Schmidt is a 81 year old  female with a significant past medical history of COPD, hypertension, and osteoporosis admitted after elective colonoscopy for respiratory failure.  Patient has family history of colon cancer and she is getting screening colonoscopy every 3 years and Dr. Neumann of [place] [facility] completed colonoscopy at [facility] today.  After completion of her colonoscopy and transfer  to PACU, patient developed respiratory distress following episode of emesis and apparent aspiration.  She had been having intermittent nausea associated with a bowel prep and also vomited once at home prior to her colonoscopy.     Patient was emergently treated with nebulizer treatment, supplemental oxygen and hospitalist service was consulted for further management.  She initially required 8 L of oxygen through oxymask.     Chest x-ray showed extensive infiltrate throughout the left lung.  Her past medical history is significant for COPD and hypertension     Assessment and Plan       #1.  Acute respiratory failure with hypoxia likely secondary to aspiration pneumonitis in the setting of underlying COPD.  Acute COPD exacerbation with diffuse wheezing  --  Fortunately her oxygen requirements are decreasing.  She is still requiring 1 to 2 L of oxygen nasal cannula.  We tried her off of oxygen and her oxygen saturation dipped to the low 80% range.  -- She wanted to go home today.  I explained that it would not be safe to be at home with oxygen saturations in the 70 to 80% range.  She agreed to stay until tomorrow to see if her oxygenation improves and if she can be weaned off of oxygen.  --  If not she will likely need to be discharged on supplemental oxygen if she wishes to go home tomorrow.  --She has mild COPD by history.  She does not use home  oxygen and only uses an inhaler rarely.  -- Continue with Unasyn  -- Continue nebs          #2.  Status post colonoscopy.  -- Elective screening colonoscopy.  6 mm polyp in the ascending colon removed with a cold snare otherwise exam was normal.           Chronic  comorbid medical conditions:     #3.  Hypertension on hydrochlorothiazide  #4.  Degenerative disc disease  #5.  Osteoporosis  #6.  Protein calorie malnutrition  #7.  COPD, well controlled with inhaler.  Stopped smoking several years ago.  Does not use home oxygen  #8.  Negative COVID test.          Diet: Regular Diet Adult    DVT Prophylaxis: Pneumatic Compression Devices  Suarez Catheter: Not present  Central Lines: None  Cardiac Monitoring: None  Code Status: Full Code      Disposition Plan   Expected Discharge: 04/27/2022 if oxygenation stable.  May need home oxygen at discharge.     The patient's care was discussed with the Bedside Nurse.    Praveen Olson MD  Hospitalist Service    Securely message with the Vocera Web Console (learn more here)  Text page via [employer] Paging/Directory         Clinically Significant Risk Factors Present on Admission                      ______________________________________________________________________    Interval History   She feels okay.  She like to go home.  She has some cough but denies any shortness of breath.      Physical Exam   Vital Signs: Temp: 97.9  F (36.6  C) Temp src: Temporal BP: 108/51 Pulse: 88   Resp: 24 SpO2: 90 % O2 Device: Nasal cannula Oxygen Delivery: 3 LPM  Weight: 101 lbs 11.2 oz    Vital signs reviewed  General:  Alert, calm, NAD  CV: regular rate and rhythm, no murmurs or rubs  Lungs: Coarse breath sounds on the left, normal respiratory effort  HEENT:  Pupil round, equal, conjuctivae, sclerae and lids normal, neck is supple  Abdomen:  Soft, nontender, nondistended, no masses, normal bowel sounds  Extremities:  No edema  Neuro: normal strength and  sensation in all 4 extremities, cranial nerves grossly intact  Psychiatric:  Mood and affect within normal limits  Skin:  No rashes or wounds evident  Heme:  No lymphandenopathy appreciated    Data   WBC 18.8  Creatinine 1.0

## 2022-04-26 NOTE — PLAN OF CARE
Goal Outcome Evaluation:    Plan of Care Reviewed With: patient      No c/o pain. Infreq loose cough. Maintaining sats on 3L per NC- on cont pulse ox. Sleeping between cares. Due to void -bladder scan for 200ml at 2210. Antibiotics as ordered

## 2022-04-27 ENCOUNTER — DOCUMENTATION ONLY (OUTPATIENT)
Dept: HOME HEALTH SERVICES | Facility: CLINIC | Age: 82
End: 2022-04-27
Payer: COMMERCIAL

## 2022-04-27 VITALS
HEIGHT: 60 IN | SYSTOLIC BLOOD PRESSURE: 112 MMHG | DIASTOLIC BLOOD PRESSURE: 68 MMHG | RESPIRATION RATE: 18 BRPM | TEMPERATURE: 97.8 F | OXYGEN SATURATION: 94 % | WEIGHT: 101.7 LBS | HEART RATE: 82 BPM | BODY MASS INDEX: 19.97 KG/M2

## 2022-04-27 LAB
SARS-COV-2 RNA RESP QL NAA+PROBE: NEGATIVE
WBC # BLD AUTO: 13.1 10E3/UL (ref 4–11)

## 2022-04-27 PROCEDURE — 85048 AUTOMATED LEUKOCYTE COUNT: CPT | Performed by: INTERNAL MEDICINE

## 2022-04-27 PROCEDURE — 250N000011 HC RX IP 250 OP 636: Performed by: INTERNAL MEDICINE

## 2022-04-27 PROCEDURE — 999N000157 HC STATISTIC RCP TIME EA 10 MIN

## 2022-04-27 PROCEDURE — 250N000013 HC RX MED GY IP 250 OP 250 PS 637: Performed by: INTERNAL MEDICINE

## 2022-04-27 PROCEDURE — U0005 INFEC AGEN DETEC AMPLI PROBE: HCPCS | Performed by: INTERNAL MEDICINE

## 2022-04-27 PROCEDURE — 258N000003 HC RX IP 258 OP 636: Performed by: INTERNAL MEDICINE

## 2022-04-27 PROCEDURE — 36415 COLL VENOUS BLD VENIPUNCTURE: CPT | Performed by: INTERNAL MEDICINE

## 2022-04-27 PROCEDURE — 99239 HOSP IP/OBS DSCHRG MGMT >30: CPT | Performed by: INTERNAL MEDICINE

## 2022-04-27 PROCEDURE — 94640 AIRWAY INHALATION TREATMENT: CPT | Mod: 76

## 2022-04-27 PROCEDURE — 94640 AIRWAY INHALATION TREATMENT: CPT

## 2022-04-27 PROCEDURE — 250N000009 HC RX 250: Performed by: INTERNAL MEDICINE

## 2022-04-27 RX ORDER — ALBUTEROL SULFATE 90 UG/1
2 AEROSOL, METERED RESPIRATORY (INHALATION) EVERY 4 HOURS PRN
Qty: 18 G | Refills: 0 | Status: SHIPPED | OUTPATIENT
Start: 2022-04-27

## 2022-04-27 RX ORDER — PREDNISONE 20 MG/1
40 TABLET ORAL DAILY
Qty: 10 TABLET | Refills: 0 | Status: SHIPPED | OUTPATIENT
Start: 2022-04-27 | End: 2022-05-02

## 2022-04-27 RX ORDER — AMOXICILLIN AND CLAVULANATE POTASSIUM 500; 125 MG/1; MG/1
1 TABLET, FILM COATED ORAL 2 TIMES DAILY
Qty: 24 TABLET | Refills: 0 | Status: SHIPPED | OUTPATIENT
Start: 2022-04-27 | End: 2022-04-27

## 2022-04-27 RX ORDER — AMOXICILLIN AND CLAVULANATE POTASSIUM 500; 125 MG/1; MG/1
1 TABLET, FILM COATED ORAL 2 TIMES DAILY
Qty: 14 TABLET | Refills: 0 | Status: SHIPPED | OUTPATIENT
Start: 2022-04-27 | End: 2022-05-04

## 2022-04-27 RX ADMIN — IPRATROPIUM BROMIDE AND ALBUTEROL SULFATE 3 ML: 2.5; .5 SOLUTION RESPIRATORY (INHALATION) at 01:50

## 2022-04-27 RX ADMIN — AMPICILLIN SODIUM AND SULBACTAM SODIUM 3 G: 2; 1 INJECTION, POWDER, FOR SOLUTION INTRAMUSCULAR; INTRAVENOUS at 11:27

## 2022-04-27 RX ADMIN — POTASSIUM CHLORIDE, DEXTROSE MONOHYDRATE AND SODIUM CHLORIDE: 150; 5; 450 INJECTION, SOLUTION INTRAVENOUS at 00:03

## 2022-04-27 RX ADMIN — METHYLPREDNISOLONE SODIUM SUCCINATE 40 MG: 40 INJECTION, POWDER, FOR SOLUTION INTRAMUSCULAR; INTRAVENOUS at 08:52

## 2022-04-27 RX ADMIN — DORZOLAMIDE HYDROCHLORIDE 1 DROP: 20 SOLUTION/ DROPS OPHTHALMIC at 08:52

## 2022-04-27 RX ADMIN — AMPICILLIN SODIUM AND SULBACTAM SODIUM 3 G: 2; 1 INJECTION, POWDER, FOR SOLUTION INTRAMUSCULAR; INTRAVENOUS at 00:06

## 2022-04-27 RX ADMIN — AMLODIPINE BESYLATE 7.5 MG: 5 TABLET ORAL at 09:31

## 2022-04-27 RX ADMIN — AMPICILLIN SODIUM AND SULBACTAM SODIUM 3 G: 2; 1 INJECTION, POWDER, FOR SOLUTION INTRAMUSCULAR; INTRAVENOUS at 05:17

## 2022-04-27 RX ADMIN — METHYLPREDNISOLONE SODIUM SUCCINATE 40 MG: 40 INJECTION, POWDER, FOR SOLUTION INTRAMUSCULAR; INTRAVENOUS at 01:10

## 2022-04-27 RX ADMIN — IPRATROPIUM BROMIDE AND ALBUTEROL SULFATE 3 ML: 2.5; .5 SOLUTION RESPIRATORY (INHALATION) at 08:39

## 2022-04-27 RX ADMIN — HYDROCHLOROTHIAZIDE 12.5 MG: 12.5 CAPSULE ORAL at 09:31

## 2022-04-27 RX ADMIN — ENOXAPARIN SODIUM 40 MG: 40 INJECTION SUBCUTANEOUS at 00:02

## 2022-04-27 ASSESSMENT — ACTIVITIES OF DAILY LIVING (ADL)
ADLS_ACUITY_SCORE: 5
ADLS_ACUITY_SCORE: 7
ADLS_ACUITY_SCORE: 5
ADLS_ACUITY_SCORE: 7
ADLS_ACUITY_SCORE: 5
ADLS_ACUITY_SCORE: 5
ADLS_ACUITY_SCORE: 7
ADLS_ACUITY_SCORE: 5
ADLS_ACUITY_SCORE: 7
ADLS_ACUITY_SCORE: 5
ADLS_ACUITY_SCORE: 5

## 2022-04-27 NOTE — PROGRESS NOTES
I certify that this patient, Augusta Schmidt has been under my care (or a nurse practitioner or physican's assistant working with me). This is the face-to-face encounter for oxygen medical necessity.      Augusta Schmidt is now in a chronic stable state and continues to require supplemental oxygen. Patient has continued oxygen desaturation due to COPD J44.9.    Alternative treatment(s) tried or considered and deemed clinically infective for treatment of Chronic Bronchitis J41.0 include nebulizers, inhalers and steroids.  If portability is ordered, is the patient mobile within the home? yes    **Patients who qualify for home O2 coverage under the CMS guidelines require ABG tests or O2 sat readings obtained closest to, but no earlier than 2 days prior to the discharge, as evidence of the need for home oxygen therapy. Testing must be performed while patient is in the chronic stable state. See notes for O2 sats.**

## 2022-04-27 NOTE — PLAN OF CARE
Patient has been assessed for Home Oxygen needs. Oxygen readings:    *Pulse oximetry (SpO2) = 86% on room air at rest while awake.    *SpO2 improved to 94% on 2 liters/minute at rest.    *SpO2 = 77% on room air during activity/with exercise.    *SpO2 improved to 88% on 2 liters/minute during activity/with exercise.

## 2022-04-27 NOTE — PLAN OF CARE
Goal Outcome Evaluation:    Pt A&Ox4. Up with SBA. Lungs coarse. Using IS and flutter. Sating 92% on 1L O2. IV Solu-Medrol. Tolerating diet. Possible discharge to home tomorrow.

## 2022-04-27 NOTE — PLAN OF CARE
Pt A&Ox4, VSS, On 1L of O2 via NC. Denies pain. MILLIGAN, has a Freq. Productive cough, LS coarse. SBA. Encouraged IS and flutter. IVF, IV Unasyn, and IV Solumedrol. Voiding adequately. Possible discharge today if O2 improves.

## 2022-04-27 NOTE — DISCHARGE INSTRUCTIONS
Oxygen Provider:  Arranged through [facility] [employer] [facility], contact number [phone].  If you have any questions or concerns please call the oxygen company directly.

## 2022-04-27 NOTE — DISCHARGE SUMMARY
[facility]    Discharge Summary  Hospitalist    Date of Admission:  4/25/2022  Date of Discharge:  4/27/2022  3:04 PM  Discharging Provider: Danelle Fleming MD, MD    Discharge Diagnoses   Acute respiratory failure with hypoxia  Possible aspiration pneumonitis with COPD exacerbation    History of Present Illness   Please review admission history and physical.    Hospital Course   Augusta Schmidt was admitted on 4/25/2022.  The following problems were addressed during her hospitalization:    Active Problems:    Acute respiratory failure with hypoxia (H)  Augusta Schmidt is a 81 year old  female with a significant past medical history of COPD, hypertension, and osteoporosis admitted after elective colonoscopy for respiratory failure.  Patient has family history of colon cancer and she is getting screening colonoscopy every 3 years and Dr. Neumann of [place] [facility] completed colonoscopy at [facility] today.  After completion of her colonoscopy and transfer  to PACU, patient developed respiratory distress following episode of emesis and apparent aspiration.  She had been having intermittent nausea associated with a bowel prep and also vomited once at home prior to her colonoscopy.     Patient was emergently treated with nebulizer treatment, supplemental oxygen and hospitalist service was consulted for further management.  She initially required 8 L of oxygen through oxymask.     Chest x-ray showed extensive infiltrate throughout the left lung.  Her past medical history is significant for COPD and hypertension     Assessment and Plan       #1.  Acute respiratory failure with hypoxia likely secondary to aspiration pneumonitis in the setting of underlying COPD.  Acute COPD exacerbation with diffuse wheezing  --  Fortunately her oxygen requirements are decreasing.  She is still requiring 1 to 2 L of oxygen nasal cannula.  We tried her off of oxygen and her oxygen saturation  dipped to the low 80% range.  Patient required oxygen on the day of discharge, she was assessed for O2 and O2 was ordered for discharge home.  She was treated with a nebs and Unasyn while here, she will be discharged on oral Augmentin to complete the course.       #2.  Status post colonoscopy.  -- Elective screening colonoscopy.  6 mm polyp in the ascending colon removed with a cold snare otherwise exam was normal.           Chronic  comorbid medical conditions:     #3.  Hypertension on hydrochlorothiazide  #4.  Degenerative disc disease  #5.  Osteoporosis  #6.  Protein calorie malnutrition  #7.  COPD, well controlled with inhaler.  Stopped smoking several years ago.  Does not use home oxygen  #8.  Negative COVID test.        Danelle Fleming MD    Significant Results and Procedures       Pending Results     Unresulted Labs Ordered in the Past 30 Days of this Admission     No orders found from 3/26/2022 to 4/26/2022.          Code Status   Full Code       Primary Care Physician   Eva Baker    Physical Exam                      Vitals:    04/22/22 1000 04/25/22 0728 04/26/22 0616   Weight: 45 kg (99 lb 3.2 oz) 44.4 kg (97 lb 14.4 oz) 46.1 kg (101 lb 11.2 oz)     Vital Signs with Ranges     I/O last 3 completed shifts:  In: 3197 [P.O.:570; I.V.:2627]  Out: 2325 [Urine:2325]    The patient was examined on the day of discharge.    Discharge Disposition   Discharged to home  Condition at discharge: Stable    Consultations This Hospital Stay   HOSPITALIST IP CONSULT  RESPIRATORY CARE IP CONSULT  SMOKING CESSATION PROGRAM IP CONSULT    Time Spent on this Encounter   I, Danelle Fleming MD, personally saw the patient today and spent greater than 30 minutes discharging this patient.    Discharge Orders      Reason for your hospital stay    Shortness of breath following colonoscopy procedure, possible aspiration pneumonitis     Follow-up and recommended labs and tests     Follow up with primary care provider, vEa GOODWIN  Olivia, within 7 days for hospital follow- up.  The following labs/tests are recommended: Please evaluate patient's oxygen needs during follow-up..     Activity    Your activity upon discharge: activity as tolerated     Oxygen Adult/Peds     Diet    Follow this diet upon discharge: Orders Placed This Encounter      Regular Diet Adult     Discharge Medications   Discharge Medication List as of 4/27/2022  1:40 PM      START taking these medications    Details   albuterol (PROAIR HFA/PROVENTIL HFA/VENTOLIN HFA) 108 (90 Base) MCG/ACT inhaler Inhale 2 puffs into the lungs every 4 hours as needed for shortness of breath / dyspnea or wheezing, Disp-18 g, R-0, E-PrescribePharmacy may dispense brand covered by insurance (Proair, or proventil or ventolin or generic albuterol inhaler)      predniSONE (DELTASONE) 20 MG tablet Take 2 tablets (40 mg) by mouth daily for 5 days, Disp-10 tablet, R-0, E-Prescribe         CONTINUE these medications which have CHANGED    Details   amoxicillin-clavulanate (AUGMENTIN) 500-125 MG tablet Take 1 tablet by mouth 2 times daily for 7 days, Disp-14 tablet, R-0, E-Prescribe         CONTINUE these medications which have NOT CHANGED    Details   amLODIPine (NORVASC) 2.5 MG tablet Take 7.5 mg by mouth daily, Historical      calcium carbonate (OS-MERCY) 1500 (600 Ca) MG tablet Take 600 mg by mouth daily, Historical      cholecalciferol (VITAMIN D3) 125 mcg (5000 units) capsule Take 125 mcg by mouth daily, Historical      dorzolamide (TRUSOPT) 2 % ophthalmic solution Place 1 drop into both eyes 2 times daily, Historical      hydrochlorothiazide (MICROZIDE) 12.5 MG capsule Take 12.5 mg by mouth daily, Historical      Multiple Vitamin (ONE-A-DAY ESSENTIAL) TABS Take 1 tablet by mouth daily, Historical      tiotropium (SPIRIVA RESPIMAT) 2.5 MCG/ACT inhaler Inhale 2 puffs into the lungs daily, Historical           Allergies   Allergies   Allergen Reactions     Latanoprost      Shellfish-Derived Products  Other (See Comments)     Alendronic Acid Other (See Comments) and Rash     Ipratropium Rash     Levofloxacin Other (See Comments) and Rash     Comment: Rash, Description: Levaquin, Comment: Rash, Description: Levaquin     Penicillins Rash     Shellfish Allergy Nausea and Vomiting     Data   Most Recent 3 CBC's:Recent Labs   Lab Test 04/27/22  0741 04/26/22  0738   WBC 13.1* 18.8*   HGB  --  11.7   MCV  --  98   PLT  --  195      Most Recent 3 BMP's:  Recent Labs   Lab Test 04/26/22  0738 04/25/22  1525 04/25/22  0757     --   --    POTASSIUM 4.1  --   --    CHLORIDE 103  --   --    CO2 27  --   --    BUN 21  --   --    CR 1.09* 1.00 0.86   ANIONGAP 7  --   --    MERCY 8.7  --   --    *  --   --      Most Recent 2 LFT's:No lab results found.  Most Recent INR's and Anticoagulation Dosing History:  Anticoagulation Dose History    There is no flowsheet data to display.       Most Recent 3 Troponin's:No lab results found.  Most Recent Cholesterol Panel:No lab results found.  Most Recent 6 Bacteria Isolates From Any Culture (See EPIC Reports for Culture Details):No lab results found.  Most Recent TSH, T4 and A1c Labs:No lab results found.  Results for orders placed or performed during the hospital encounter of 04/25/22   XR Chest 2 Views    Narrative    CHEST TWO VIEWS 4/25/2022 2:00 PM     HISTORY: Possible aspiration of gastric contents    COMPARISON: None.       Impression    IMPRESSION: Extensive infiltrates throughout the left lung. Minimal  interstitial changes on the right. Question minimal effusion on the  left. The cardiac silhouette is not enlarged. Pulmonary vasculature is  unremarkable.    BURAK HOBBS MD         SYSTEM ID:  [id number]   CT Chest w/o Contrast    Narrative    EXAM: CT CHEST W/O CONTRAST  LOCATION: [facility]  DATE/TIME: 4/25/2022 5:28 PM    INDICATION: COPD exacerbation  COMPARISON: None.  TECHNIQUE: CT chest without IV contrast. Multiplanar reformats were  obtained. Dose reduction techniques were used.  CONTRAST: None.    FINDINGS:   LUNGS AND PLEURA: Fairly extensive left upper lobe infiltrate. Mild-moderate left lower lobe infiltrate. Diffuse mild to moderate bronchial wall thickening and bronchiectasis. Underlying emphysema evident. No effusions.    MEDIASTINUM/AXILLAE: A few mildly prominent lymph nodes are noted which may be reactive in the setting. There are moderate atherosclerotic changes of the visualized aorta and its branches. There is no evidence of aortic dissection or aneurysm.    CORONARY ARTERY CALCIFICATION: Mild.    UPPER ABDOMEN: No acute findings. Pancreatic calcifications compatible chronic pancreatitis. No frankly destructive bony lesions.    MUSCULOSKELETAL: Unremarkable.      Impression    IMPRESSION:   1.  Extensive left upper lobe infiltrates.  2.  Mild-moderate left lower lobe infiltrates.

## 2022-04-27 NOTE — PLAN OF CARE
DX: Acute resp, asp pna  Tele: na  A&O x4  Activity: SBA  Diet: Reg  VSS: Q8  O2: 2L nc 94%  BG: na  PIV: SL RA  Pain: denies  GI/: continent  Plan: Unasyn Q6, nebs Q6, sW  Discharge: Home today with home O2.  Continue plan of cares   Discussed and explained discharge paperwork and new medications with patient.  Pt also was education on her new home oxygen.  All questions were answered. Patients  to provide transportation. Pt took all personal belongings with her.

## 2022-04-27 NOTE — PROGRESS NOTES
Received intake call for home oxygen at 1:12PM. Reviewed patient's chart; Patient qualifies under insurance guidelines and all documentation is in the chart including a good order.   1:32PM - Spoke with care coordinator, Keila, confirmed we received the order. I wasn't able to find the SATS.  Keila faxed them to me.  Called back and provided them with ETA of oxygen.  1:40PM - Called to offer choice and patient is okay with [facility] setting them up. Discussed equipment with patient and informed them that we would be to bedside with oxygen in the next 2 hours.

## 2022-05-02 ENCOUNTER — DOCUMENTATION ONLY (OUTPATIENT)
Dept: OTHER | Facility: CLINIC | Age: 82
End: 2022-05-02
Payer: COMMERCIAL

## 2023-02-10 ENCOUNTER — TRANSCRIBE ORDERS (OUTPATIENT)
Dept: OTHER | Age: 83
End: 2023-02-10

## 2023-02-10 DIAGNOSIS — J44.1 COPD EXACERBATION (H): Primary | ICD-10-CM

## 2023-02-10 DIAGNOSIS — J96.01 ACUTE RESPIRATORY FAILURE WITH HYPOXIA (H): ICD-10-CM

## 2023-02-10 DIAGNOSIS — J44.9 STAGE 3 SEVERE COPD BY GOLD CLASSIFICATION (H): ICD-10-CM

## 2023-02-28 ENCOUNTER — TRANSCRIBE ORDERS (OUTPATIENT)
Dept: OTHER | Age: 83
End: 2023-02-28

## 2023-03-01 ENCOUNTER — HOSPITAL ENCOUNTER (OUTPATIENT)
Dept: CARDIAC REHAB | Facility: CLINIC | Age: 83
Discharge: HOME | End: 2023-03-01
Attending: PHYSICIAN ASSISTANT
Payer: COMMERCIAL

## 2023-03-01 PROCEDURE — 94626 PHY/QHP OP PULM RHB W/MNTR: CPT

## 2023-04-05 ENCOUNTER — HOSPITAL ENCOUNTER (OUTPATIENT)
Dept: CARDIAC REHAB | Facility: CLINIC | Age: 83
Discharge: HOME | End: 2023-04-05
Attending: PHYSICIAN ASSISTANT
Payer: COMMERCIAL

## 2023-04-05 PROCEDURE — 94625 PHY/QHP OP PULM RHB W/O MNTR: CPT

## 2023-04-18 ENCOUNTER — HOSPITAL ENCOUNTER (OUTPATIENT)
Dept: CARDIAC REHAB | Facility: CLINIC | Age: 83
Discharge: HOME | End: 2023-04-18
Attending: PHYSICIAN ASSISTANT
Payer: COMMERCIAL

## 2023-04-18 PROCEDURE — 94625 PHY/QHP OP PULM RHB W/O MNTR: CPT

## 2023-04-20 ENCOUNTER — HOSPITAL ENCOUNTER (OUTPATIENT)
Dept: CARDIAC REHAB | Facility: CLINIC | Age: 83
Discharge: HOME | End: 2023-04-20
Attending: PHYSICIAN ASSISTANT
Payer: COMMERCIAL

## 2023-04-20 PROCEDURE — 94625 PHY/QHP OP PULM RHB W/O MNTR: CPT

## 2023-04-25 ENCOUNTER — HOSPITAL ENCOUNTER (OUTPATIENT)
Dept: CARDIAC REHAB | Facility: CLINIC | Age: 83
Discharge: HOME | End: 2023-04-25
Attending: PHYSICIAN ASSISTANT
Payer: COMMERCIAL

## 2023-04-25 PROCEDURE — 94625 PHY/QHP OP PULM RHB W/O MNTR: CPT

## 2023-04-27 ENCOUNTER — HOSPITAL ENCOUNTER (OUTPATIENT)
Dept: CARDIAC REHAB | Facility: CLINIC | Age: 83
Discharge: HOME | End: 2023-04-27
Attending: PHYSICIAN ASSISTANT
Payer: COMMERCIAL

## 2023-04-27 PROCEDURE — 94625 PHY/QHP OP PULM RHB W/O MNTR: CPT

## 2023-05-02 ENCOUNTER — HOSPITAL ENCOUNTER (OUTPATIENT)
Dept: CARDIAC REHAB | Facility: CLINIC | Age: 83
Discharge: HOME | End: 2023-05-02
Attending: PHYSICIAN ASSISTANT
Payer: COMMERCIAL

## 2023-05-02 PROCEDURE — 94625 PHY/QHP OP PULM RHB W/O MNTR: CPT

## 2023-05-09 ENCOUNTER — HOSPITAL ENCOUNTER (OUTPATIENT)
Dept: CARDIAC REHAB | Facility: CLINIC | Age: 83
Discharge: HOME | End: 2023-05-09
Attending: PHYSICIAN ASSISTANT
Payer: COMMERCIAL

## 2023-05-09 PROCEDURE — 94625 PHY/QHP OP PULM RHB W/O MNTR: CPT

## 2023-05-11 ENCOUNTER — HOSPITAL ENCOUNTER (OUTPATIENT)
Dept: CARDIAC REHAB | Facility: CLINIC | Age: 83
Discharge: HOME | End: 2023-05-11
Attending: PHYSICIAN ASSISTANT
Payer: COMMERCIAL

## 2023-05-11 PROCEDURE — 94625 PHY/QHP OP PULM RHB W/O MNTR: CPT

## 2023-05-16 ENCOUNTER — HOSPITAL ENCOUNTER (OUTPATIENT)
Dept: CARDIAC REHAB | Facility: CLINIC | Age: 83
Discharge: HOME | End: 2023-05-16
Attending: PHYSICIAN ASSISTANT
Payer: COMMERCIAL

## 2023-05-16 PROCEDURE — 94625 PHY/QHP OP PULM RHB W/O MNTR: CPT

## 2023-05-18 ENCOUNTER — HOSPITAL ENCOUNTER (OUTPATIENT)
Dept: CARDIAC REHAB | Facility: CLINIC | Age: 83
Discharge: HOME | End: 2023-05-18
Attending: PHYSICIAN ASSISTANT
Payer: COMMERCIAL

## 2023-05-18 PROCEDURE — 94625 PHY/QHP OP PULM RHB W/O MNTR: CPT

## 2023-05-23 ENCOUNTER — HOSPITAL ENCOUNTER (OUTPATIENT)
Dept: CARDIAC REHAB | Facility: CLINIC | Age: 83
Discharge: HOME | End: 2023-05-23
Attending: PHYSICIAN ASSISTANT
Payer: COMMERCIAL

## 2023-05-23 PROCEDURE — 94625 PHY/QHP OP PULM RHB W/O MNTR: CPT

## 2024-08-05 NOTE — ANESTHESIA CARE TRANSFER NOTE
Patient: Augusta Schmidt    Procedure: Procedure(s):  COLONOSCOPY       Diagnosis: Screening for colon cancer [Z12.11]  Diagnosis Additional Information: No value filed.    Anesthesia Type:   MAC     Note:    Oropharynx: spontaneously breathing  Level of Consciousness: drowsy  Oxygen Supplementation: face mask  Level of Supplemental Oxygen (L/min / FiO2): 6  Airway patency satisfactory and stable: see note.  Dentition: dentition unchanged    Report to RN Given: handoff report given  Patient transferred to: PACU  Comments: See intraop note  Handoff Report: Identifed the Patient, Identified the Reponsible Provider, Reviewed the pertinent medical history, Discussed the surgical course, Reviewed Intra-OP anesthesia mangement and issues during anesthesia and Allowed opportunity for questions and acknowledgement of understanding      Vitals:  Vitals Value Taken Time   /101 04/25/22 1011   Temp     Pulse 100 04/25/22 1014   Resp 17 04/25/22 1014   SpO2 96 % 04/25/22 1014   Vitals shown include unvalidated device data.    Electronically Signed By: LONNIE Alvarez CRNA  April 25, 2022  10:15 AM  
L leg: +tender nonhealing ulceration noted to left shin, no active drainage noted, no surrounding erythema or streaking noted, no edema of LE noted, FROM LLE, toes warm & mobile, distal pulses and sensation intact, NVI

## (undated) DEVICE — SOL WATER IRRIG 1000ML BOTTLE 2F7114

## (undated) DEVICE — SPECIMEN TRAP MUCOUS SIMILAC NTRL CARE GRAD 80ML 0045860

## (undated) DEVICE — PAD CHUX UNDERPAD 23X24" 7136

## (undated) DEVICE — BAG CLEAR TRASH 1.3M 39X33" P4040C

## (undated) DEVICE — ENDO SNARE POLYPECTOMY OVAL 15MM LOOP SD-240U-15

## (undated) DEVICE — MANIFOLD NEPTUNE 4 PORT 700-20

## (undated) DEVICE — TUBING SUCTION MEDI-VAC SOFT 3/16"X20' N520A

## (undated) RX ORDER — ALBUTEROL SULFATE 0.83 MG/ML
SOLUTION RESPIRATORY (INHALATION)
Status: DISPENSED
Start: 2022-04-25

## (undated) RX ORDER — IPRATROPIUM BROMIDE AND ALBUTEROL SULFATE 2.5; .5 MG/3ML; MG/3ML
SOLUTION RESPIRATORY (INHALATION)
Status: DISPENSED
Start: 2022-04-25